# Patient Record
Sex: FEMALE | Race: WHITE
[De-identification: names, ages, dates, MRNs, and addresses within clinical notes are randomized per-mention and may not be internally consistent; named-entity substitution may affect disease eponyms.]

---

## 2019-05-14 ENCOUNTER — APPOINTMENT (OUTPATIENT)
Dept: OTOLARYNGOLOGY | Facility: CLINIC | Age: 39
End: 2019-05-14
Payer: COMMERCIAL

## 2019-05-14 VITALS
HEIGHT: 59 IN | WEIGHT: 115 LBS | BODY MASS INDEX: 23.18 KG/M2 | HEART RATE: 85 BPM | DIASTOLIC BLOOD PRESSURE: 67 MMHG | SYSTOLIC BLOOD PRESSURE: 113 MMHG

## 2019-05-14 DIAGNOSIS — Z82.5 FAMILY HISTORY OF ASTHMA AND OTHER CHRONIC LOWER RESPIRATORY DISEASES: ICD-10-CM

## 2019-05-14 DIAGNOSIS — F31.30 BIPOLAR DISORDER, CURRENT EPISODE DEPRESSED, MILD OR MODERATE SEVERITY, UNSPECIFIED: ICD-10-CM

## 2019-05-14 DIAGNOSIS — Z87.39 PERSONAL HISTORY OF OTHER DISEASES OF THE MUSCULOSKELETAL SYSTEM AND CONNECTIVE TISSUE: ICD-10-CM

## 2019-05-14 DIAGNOSIS — Z87.42 PERSONAL HISTORY OF OTHER DISEASES OF THE FEMALE GENITAL TRACT: ICD-10-CM

## 2019-05-14 DIAGNOSIS — Z87.09 PERSONAL HISTORY OF OTHER DISEASES OF THE RESPIRATORY SYSTEM: ICD-10-CM

## 2019-05-14 DIAGNOSIS — M62.89 OTHER SPECIFIED DISORDERS OF MUSCLE: ICD-10-CM

## 2019-05-14 DIAGNOSIS — K30 FUNCTIONAL DYSPEPSIA: ICD-10-CM

## 2019-05-14 DIAGNOSIS — Z87.19 PERSONAL HISTORY OF OTHER DISEASES OF THE DIGESTIVE SYSTEM: ICD-10-CM

## 2019-05-14 DIAGNOSIS — Z83.3 FAMILY HISTORY OF DIABETES MELLITUS: ICD-10-CM

## 2019-05-14 DIAGNOSIS — Z91.013 ALLERGY TO SEAFOOD: ICD-10-CM

## 2019-05-14 DIAGNOSIS — Z00.00 ENCOUNTER FOR GENERAL ADULT MEDICAL EXAMINATION W/OUT ABNORMAL FINDINGS: ICD-10-CM

## 2019-05-14 DIAGNOSIS — Z86.59 PERSONAL HISTORY OF OTHER MENTAL AND BEHAVIORAL DISORDERS: ICD-10-CM

## 2019-05-14 PROCEDURE — 31231 NASAL ENDOSCOPY DX: CPT

## 2019-05-14 PROCEDURE — 99213 OFFICE O/P EST LOW 20 MIN: CPT | Mod: 25

## 2019-05-14 RX ORDER — IPRATROPIUM BROMIDE AND ALBUTEROL SULFATE 2.5; .5 MG/3ML; MG/3ML
SOLUTION RESPIRATORY (INHALATION)
Refills: 0 | Status: ACTIVE | COMMUNITY

## 2019-05-14 RX ORDER — LORATADINE 5 MG
17 TABLET,CHEWABLE ORAL
Refills: 0 | Status: ACTIVE | COMMUNITY

## 2019-05-14 RX ORDER — RABEPRAZOLE SODIUM 20 MG/1
20 TABLET, DELAYED RELEASE ORAL
Refills: 0 | Status: ACTIVE | COMMUNITY

## 2019-05-14 RX ORDER — LITHIUM CARBONATE 150 MG/1
150 CAPSULE ORAL
Refills: 0 | Status: ACTIVE | COMMUNITY

## 2019-05-14 RX ORDER — MONTELUKAST SODIUM 10 MG/1
TABLET, FILM COATED ORAL
Refills: 0 | Status: ACTIVE | COMMUNITY

## 2019-05-14 RX ORDER — LEVONORGESTREL AND ETHINYL ESTRADIOL 0.1-0.02MG
KIT ORAL
Refills: 0 | Status: ACTIVE | COMMUNITY

## 2019-05-14 RX ORDER — DIVALPROEX SODIUM 500 MG/1
TABLET, DELAYED RELEASE ORAL
Refills: 0 | Status: ACTIVE | COMMUNITY

## 2019-05-14 RX ORDER — LINACLOTIDE 72 UG/1
72 CAPSULE, GELATIN COATED ORAL
Refills: 0 | Status: ACTIVE | COMMUNITY

## 2019-05-14 RX ORDER — CLONAZEPAM 2 MG/1
TABLET ORAL
Refills: 0 | Status: ACTIVE | COMMUNITY

## 2019-05-14 RX ORDER — FLUTICASONE PROPIONATE AND SALMETEROL 50; 250 UG/1; UG/1
250-50 POWDER RESPIRATORY (INHALATION)
Refills: 0 | Status: ACTIVE | COMMUNITY

## 2019-05-14 RX ORDER — ALBUTEROL SULFATE 2.5 MG/3ML
VIAL, NEBULIZER (ML) INHALATION
Refills: 0 | Status: ACTIVE | COMMUNITY

## 2019-05-14 RX ORDER — DOCUSATE SODIUM 100 MG/1
CAPSULE ORAL
Refills: 0 | Status: ACTIVE | COMMUNITY

## 2019-05-14 NOTE — CONSULT LETTER
[Dear  ___] : Dear  [unfilled], [Please see my note below.] : Please see my note below. [Courtesy Letter:] : I had the pleasure of seeing your patient, [unfilled], in my office today. [Consult Closing:] : Thank you very much for allowing me to participate in the care of this patient.  If you have any questions, please do not hesitate to contact me. [Sincerely,] : Sincerely, [FreeTextEntry3] : Allyn Russell MD\par

## 2019-05-14 NOTE — ASSESSMENT
[FreeTextEntry1] : She has had a cough, ear pressure, ear pain, nasal congestion and postnasal drip. This started after she had the stomach flu. She improved with antibiotics but her symptoms returned. Nasal endoscopy showed no sinus infection. Her sinuses looked clear. She is likely having exacerbation of reflux. She has reflux-related laryngeal changes on flexible laryngoscopy. She also has TMJ dysfunction. She has not been wearing her nightguard\par \par PLAN\par \par -findings and management options discussed in detail with the patient. \par -Nasal saline rinses, nasal steroid spray (such as fluticasone), antihistamine/decongestant as needed\par -consider repeat Allergy evaluation- she said there may be a water leak in her apartment\par -Continue reflux precautions and medication for reflux\par -Treatment for TMJD recommended. I asked her to wear her nightguard.  She may consider physical therapy\par -She'll speak with her PCP to see if she requires further pulmonary evaluation\par -follow up in the next couple of weeks if her symptoms do not improve. If she is doing well, I will see her back in 3-6 months\par -call and return earlier if any concerns. \par

## 2019-05-14 NOTE — HISTORY OF PRESENT ILLNESS
[de-identified] : ALLEN FLORES is a 39 year patient With a history of chronic sinusitis, sinus surgery, reflux, and TMJ dysfunction. She had been doing well until she developed symptoms after a stomach flu about 4 weeks ago. She had sinus pain and pressure and a cough. She was initially thought to have a viral infection. She improved and then she had a flareup of her asthma. She also had recurrent symptoms. She was treated with Augmentin which helped. Her symptoms returned since she finished medication. She has no nasal drainage but does have nasal congestion and a sensation of restrictive airway. She also has ear pain and pressure, and postnasal drip. She is using inhalers for the asthma. She takes Singulair. She is not currently using a nasal steroid spray. She is on AcipHex twice a day. She has TMJ dysfunction but has not been using her nightguard. She did see a neurologist recently for a tremor.\par \par ENT history\par \par 11/13/18- possible sinus infection. She had been doing well until mid August. She had a flareup of her asthma. She was put on Singulair and aninhaler. in September, she developed a pulmonary infection. Her  had been sick. She was treated with a Z-Nazario. She then had to take Augmentin. She has a mild intermittent postnasal drip, sinus and nasal pressure, throat mucus and mild cough. she denies throat pain or dysphagia. She also had to restart Lithium which  has made her reflux worse. She is double dose aciphex. She said she had a throat culture was was negative. She is using a  for TMJ dysfunction.\par \par 1/30/18-She still has a cough. the burning sensation in her throat is improved but not resolved. She is having a lot problems with reflux. She had to restart her lithium. She has spoken with her gastroenterologist. She is on medication for the reflux. 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 An  has the flu. The dizziness has improved. she thinks it could be due to her medication. A culture was sent to rule out bacterial and fungal infection at her last vist. Unfortunately the specimen was lost in transport. She is here for repeat culture. Culture is negative.\par \par 1/11/18- She has had a one-month history of a burning sensation in her mouth and tongue. She denies\par dysphagia or voice change. She has been under more stress. She also had exacerbation of her reflux. She is\par on medication for reflux. She is taking it once a day. She is working with her gastroenterologist to get it under\par control. Her sinuses have been doing very well. She has not had a sinus infection. She has no nasal\par obstruction. She has slight headaches. She uses nasal saline rinses and allergy medications as needed.\par She also has a clicking sensation and a swishing sensation in her ear. She has a history of TMJ dysfunction.\par She has mild otalgia. She also has a history of dizziness. It has been occurring intermittently since July. The\par episodes last several seconds. She sometimes has a spinning sensation when she turns in bed. Most of the\par time, she feels a sudden drop in pressure like she is going to fall when she stands up. She said that she had an evaluation when she was younger by a neurologist for other reasons. 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 She said that specialized inner ear testing was attempted in the past. Because of her medications, they were unable to get results. DHP- negative. NE/FL\par Audiogram 3/2017–normal hearing, word recognition, and tympanograms\par \par 10/19/17- patient seen for a history of chronic sinusitis. She was followed by Dr. Givens. She had endoscopic sinus surgery with septoplasty in July. She also suffers from asthma, reflux, and other medical conditions. She has done extremely well following surgery. She had a mild upper respiratory tract infection in September. She has some pulmonary symptoms. 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 She started using Nasonex again. She uses nasal saline rinses. She tries to avoid taking steroids. She has mild intermittent nasal congestion but no facial pain or headaches. Her breathing is good. She takes medication for reflux. She occasionally feels like she is She takes medication for reflux. She occasionally feels like she is going to drop on the floor. She has no vertigo. She thinks it could be due to one of her medications. NE/FL

## 2019-06-04 ENCOUNTER — APPOINTMENT (OUTPATIENT)
Dept: OTOLARYNGOLOGY | Facility: CLINIC | Age: 39
End: 2019-06-04
Payer: COMMERCIAL

## 2019-06-04 PROCEDURE — 99213 OFFICE O/P EST LOW 20 MIN: CPT | Mod: 25

## 2019-06-04 PROCEDURE — 31231 NASAL ENDOSCOPY DX: CPT

## 2019-06-04 RX ORDER — LINACLOTIDE 145 UG/1
145 CAPSULE, GELATIN COATED ORAL
Qty: 90 | Refills: 0 | Status: ACTIVE | COMMUNITY
Start: 2018-04-03

## 2019-06-04 RX ORDER — DIPHENHYDRAMINE HYDROCHLORIDE AND LIDOCAINE HYDROCHLORIDE AND ALUMINUM HYDROXIDE AND MAGNESIUM HYDRO
KIT 4 TIMES DAILY
Qty: 60 | Refills: 0 | Status: ACTIVE | COMMUNITY
Start: 2019-06-04 | End: 1900-01-01

## 2019-06-04 RX ORDER — DIVALPROEX SODIUM 125 MG/1
125 TABLET, DELAYED RELEASE ORAL
Qty: 30 | Refills: 0 | Status: ACTIVE | COMMUNITY
Start: 2019-01-08

## 2019-06-04 RX ORDER — ESCITALOPRAM OXALATE 5 MG/5ML
5 SOLUTION ORAL
Qty: 75 | Refills: 0 | Status: ACTIVE | COMMUNITY
Start: 2018-11-19

## 2019-06-04 RX ORDER — DIVALPROEX SODIUM 250 MG/1
250 TABLET, DELAYED RELEASE ORAL
Qty: 150 | Refills: 0 | Status: ACTIVE | COMMUNITY
Start: 2019-01-08

## 2019-06-04 RX ORDER — LITHIUM CARBONATE 300 MG/1
300 TABLET, FILM COATED, EXTENDED RELEASE ORAL
Qty: 30 | Refills: 0 | Status: ACTIVE | COMMUNITY
Start: 2018-12-12

## 2019-06-04 NOTE — ASSESSMENT
[FreeTextEntry1] : She is doing better following day course of Levaquin. She has left sinus pain and pressure. The sinuses are clear on nasal endoscopy. This is likely referred pain from TMJ dysfunction. She has also been having reflux exacerbation. She is on medication. She saw her gastroenterologist.  She also has a burning sensation of her tongue. We discussed the possibility of burning tongue syndrome. A culture was sent to rule out fungal infection. \par \par PLAN\par \par -findings and management options discussed in detail with the patient. \par -Nasal saline rinses, nasal steroid spray (such as fluticasone), antihistamine/decongestant as needed\par -she is going to go for repeat Allergy evaluation\par -Continue reflux precautions and medication for reflux\par -Treatment for TMJD recommended again. she should wear her nightguard.  \par -consider physical therapy for TMJD\par -pulmonary evaluation recommended\par -I discussed management for burning tongue syndrome. I asked her to avoid alcohol base mouthwashes. She may try Vitamin B12. I also gave her Magic mouthwash to use as needed for pain. She has taken Magic mouthwash in the past\par -she was asked to call for the culture results. \par -follow up in 2 weeks if not better. If she is better, follow up in 3 months. \par -call and return earlier if any concerns. \par

## 2019-06-04 NOTE — CONSULT LETTER
[Dear  ___] : Dear  [unfilled], [Courtesy Letter:] : I had the pleasure of seeing your patient, [unfilled], in my office today. [Please see my note below.] : Please see my note below. [Consult Closing:] : Thank you very much for allowing me to participate in the care of this patient.  If you have any questions, please do not hesitate to contact me. [Sincerely,] : Sincerely, [FreeTextEntry3] : Allyn Russell MD\par

## 2019-06-04 NOTE — REASON FOR VISIT
[Subsequent Evaluation] : a subsequent evaluation for [FreeTextEntry2] : cough and chronic sinusitis

## 2019-06-04 NOTE — HISTORY OF PRESENT ILLNESS
[FreeTextEntry1] : \par \par 6/4/19- Lacey Garza Is here for followup. She felt better after her last visit. She then developed recurrent symptoms of chills, cough, sinus pressure and nasal congestion. She saw her PCP and was placed on Levaquin. She is improving. She still has a mild cough and post nasal drip. She also has sharp left facial pain over the maxillary sinus. She also has burning of the anterior tongue. She is using her allergy and sinus medications. She is also medication for reflux. [de-identified] : ALLEN FLORES is a 39 year patient With a history of chronic sinusitis, sinus surgery, reflux, and TMJ dysfunction. She had been doing well until she developed symptoms after a stomach flu about 4 weeks ago. She had sinus pain and pressure and a cough. She was initially thought to have a viral infection. She improved and then she had a flareup of her asthma. She also had recurrent symptoms. She was treated with Augmentin which helped. Her symptoms returned since she finished medication. She has no nasal drainage but does have nasal congestion and a sensation of restrictive airway. She also has ear pain and pressure, and postnasal drip. She is using inhalers for the asthma. She takes Singulair. She is not currently using a nasal steroid spray. She is on AcipHex twice a day. She has TMJ dysfunction but has not been using her nightguard. She did see a neurologist recently for a tremor.\par \par ENT history\par \par 11/13/18- possible sinus infection. She had been doing well until mid August. She had a flareup of her asthma. She was put on Singulair and aninhaler. in September, she developed a pulmonary infection. Her  had been sick. She was treated with a Z-Nazario. She then had to take Augmentin. She has a mild intermittent postnasal drip, sinus and nasal pressure, throat mucus and mild cough. she denies throat pain or dysphagia. She also had to restart Lithium which  has made her reflux worse. She is double dose aciphex. She said she had a throat culture was was negative. She is using a  for TMJ dysfunction.\par \par 1/30/18-She still has a cough. the burning sensation in her throat is improved but not resolved. She is having a lot problems with reflux. She had to restart her lithium. She has spoken with her gastroenterologist. She is on medication for the reflux. 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 An  has the flu. The dizziness has improved. she thinks it could be due to her medication. A culture was sent to rule out bacterial and fungal infection at her last vist. Unfortunately the specimen was lost in transport. She is here for repeat culture. Culture is negative.\par \par 1/11/18- She has had a one-month history of a burning sensation in her mouth and tongue. She denies\par dysphagia or voice change. She has been under more stress. She also had exacerbation of her reflux. She is\par on medication for reflux. She is taking it once a day. She is working with her gastroenterologist to get it under\par control. Her sinuses have been doing very well. She has not had a sinus infection. She has no nasal\par obstruction. She has slight headaches. She uses nasal saline rinses and allergy medications as needed.\par She also has a clicking sensation and a swishing sensation in her ear. She has a history of TMJ dysfunction.\par She has mild otalgia. She also has a history of dizziness. It has been occurring intermittently since July. The\par episodes last several seconds. She sometimes has a spinning sensation when she turns in bed. Most of the\par time, she feels a sudden drop in pressure like she is going to fall when she stands up. She said that she had an evaluation when she was younger by a neurologist for other reasons. 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 She said that specialized inner ear testing was attempted in the past. Because of her medications, they were unable to get results. DHP- negative. NE/FL\par Audiogram 3/2017–normal hearing, word recognition, and tympanograms\par \par 10/19/17- patient seen for a history of chronic sinusitis. She was followed by Dr. Givens. She had endoscopic sinus surgery with septoplasty in July. She also suffers from asthma, reflux, and other medical conditions. She has done extremely well following surgery. She had a mild upper respiratory tract infection in September. She has some pulmonary symptoms. 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 She started using Nasonex again. She uses nasal saline rinses. She tries to avoid taking steroids. She has mild intermittent nasal congestion but no facial pain or headaches. Her breathing is good. She takes medication for reflux. She occasionally feels like she is She takes medication for reflux. She occasionally feels like she is going to drop on the floor. She has no vertigo. She thinks it could be due to one of her medications. NE/FL

## 2019-06-12 LAB — FUNGUS SPEC CULT ORG #8: ABNORMAL

## 2019-06-12 RX ORDER — NYSTATIN 100000 [USP'U]/ML
100000 SUSPENSION ORAL 3 TIMES DAILY
Qty: 105 | Refills: 1 | Status: ACTIVE | COMMUNITY
Start: 2019-06-12 | End: 1900-01-01

## 2019-08-26 ENCOUNTER — APPOINTMENT (OUTPATIENT)
Dept: OTOLARYNGOLOGY | Facility: CLINIC | Age: 39
End: 2019-08-26
Payer: COMMERCIAL

## 2019-08-26 DIAGNOSIS — K14.6 GLOSSODYNIA: ICD-10-CM

## 2019-08-26 PROCEDURE — 31231 NASAL ENDOSCOPY DX: CPT

## 2019-08-26 PROCEDURE — 99213 OFFICE O/P EST LOW 20 MIN: CPT | Mod: 25

## 2019-08-26 NOTE — HISTORY OF PRESENT ILLNESS
[de-identified] : ALLEN FLORES is a 39 year patient With a history of chronic sinusitis, sinus surgery, reflux, and TMJ dysfunction. She had been doing well until she developed symptoms after a stomach flu about 4 weeks ago. She had sinus pain and pressure and a cough. She was initially thought to have a viral infection. She improved and then she had a flareup of her asthma. She also had recurrent symptoms. She was treated with Augmentin which helped. Her symptoms returned since she finished medication. She has no nasal drainage but does have nasal congestion and a sensation of restrictive airway. She also has ear pain and pressure, and postnasal drip. She is using inhalers for the asthma. She takes Singulair. She is not currently using a nasal steroid spray. She is on AcipHex twice a day. She has TMJ dysfunction but has not been using her nightguard. She did see a neurologist recently for a tremor.\par \par ENT history\par \par 11/13/18- possible sinus infection. She had been doing well until mid August. She had a flareup of her asthma. She was put on Singulair and aninhaler. in September, she developed a pulmonary infection. Her  had been sick. She was treated with a Z-Nazario. She then had to take Augmentin. She has a mild intermittent postnasal drip, sinus and nasal pressure, throat mucus and mild cough. she denies throat pain or dysphagia. She also had to restart Lithium which  has made her reflux worse. She is double dose aciphex. She said she had a throat culture was was negative. She is using a  for TMJ dysfunction.\par \par 1/30/18-She still has a cough. the burning sensation in her throat is improved but not resolved. She is having a lot problems with reflux. She had to restart her lithium. She has spoken with her gastroenterologist. She is on medication for the reflux. 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 An  has the flu. The dizziness has improved. she thinks it could be due to her medication. A culture was sent to rule out bacterial and fungal infection at her last vist. Unfortunately the specimen was lost in transport. She is here for repeat culture. Culture is negative.\par \par 1/11/18- She has had a one-month history of a burning sensation in her mouth and tongue. She denies\par dysphagia or voice change. She has been under more stress. She also had exacerbation of her reflux. She is\par on medication for reflux. She is taking it once a day. She is working with her gastroenterologist to get it under\par control. Her sinuses have been doing very well. She has not had a sinus infection. She has no nasal\par obstruction. She has slight headaches. She uses nasal saline rinses and allergy medications as needed.\par She also has a clicking sensation and a swishing sensation in her ear. She has a history of TMJ dysfunction.\par She has mild otalgia. She also has a history of dizziness. It has been occurring intermittently since July. The\par episodes last several seconds. She sometimes has a spinning sensation when she turns in bed. Most of the\par time, she feels a sudden drop in pressure like she is going to fall when she stands up. She said that she had an evaluation when she was younger by a neurologist for other reasons. 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 She said that specialized inner ear testing was attempted in the past. Because of her medications, they were unable to get results. DHP- negative. NE/FL\par Audiogram 3/2017–normal hearing, word recognition, and tympanograms\par \par 10/19/17- patient seen for a history of chronic sinusitis. She was followed by Dr. Givens. She had endoscopic sinus surgery with septoplasty in July. She also suffers from asthma, reflux, and other medical conditions. She has done extremely well following surgery. She had a mild upper respiratory tract infection in September. She has some pulmonary symptoms. 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 She started using Nasonex again. She uses nasal saline rinses. She tries to avoid taking steroids. She has mild intermittent nasal congestion but no facial pain or headaches. Her breathing is good. She takes medication for reflux. She occasionally feels like she is She takes medication for reflux. She occasionally feels like she is going to drop on the floor. She has no vertigo. She thinks it could be due to one of her medications. NE/FL [FreeTextEntry1] : \par \par 6/4/19- Lacey Garza Is here for followup. She felt better after her last visit. She then developed recurrent symptoms of chills, cough, sinus pressure and nasal congestion. She saw her PCP and was placed on Levaquin. She is improving. She still has a mild cough and post nasal drip. She also has sharp left facial pain over the maxillary sinus. She also has burning of the anterior tongue. She is using her allergy and sinus medications. She is also medication for reflux.\par \par 8/26/19- Lacey Mcconnell is here for follow up for chronic sinusitis and reflux.  she felt better after treatment for candida in June. She continues to have left facial pain over the medial maxilla.  She has left nasal congestion. She had an upper endoscopy and colonoscopy last week.  She may have irritation of the nasal cavity for that.  She said she had a sinus x-ray.  she has a dental appointment. She has TMJD and just started wearing her nightguard again.  She has a mild sore throat and postnasal drip.  She is working with her gastroenterologist to manage the reflux. She was found to have a hiatal hernia and gastric polyps.

## 2019-08-26 NOTE — ASSESSMENT
[FreeTextEntry1] : She is doing well except he has persistent left sinus pain and pressure. On nasal endoscopy, there is no sinus infection. I suspect this is due to TMJ dysfunction. She started during her nightguard. She has a dental appointment pending. She is also working with her gastroenterologist to manage her reflux. She has mild left nasal congestion and dryness.\par \par PLAN\par \par -findings and management options discussed in detail with the patient. \par -Nasal saline rinses, nasal steroid spray (such as fluticasone), antihistamine/decongestant as needed\par -moisturizing nasal gel for the dryness\par -allergy management\par -Continue reflux precautions and medication for reflux per her GI\par -Treatment for TMJD recommended again.  She is now using her nightguard\par -she may consider physical therapy for TMJD\par -follow up if she has persistent or worsening symptoms. otherwise If she is better, follow up in approximately 3 months. I asked her to call me with an update after the dental appointment.

## 2019-09-02 PROBLEM — Z86.59 HISTORY OF POST TRAUMATIC STRESS DISORDER: Status: RESOLVED | Noted: 2019-05-14 | Resolved: 2019-09-02

## 2019-12-12 ENCOUNTER — APPOINTMENT (OUTPATIENT)
Dept: OTOLARYNGOLOGY | Facility: CLINIC | Age: 39
End: 2019-12-12
Payer: COMMERCIAL

## 2019-12-12 DIAGNOSIS — K21.9 GASTRO-ESOPHAGEAL REFLUX DISEASE W/OUT ESOPHAGITIS: ICD-10-CM

## 2019-12-12 DIAGNOSIS — J32.9 CHRONIC SINUSITIS, UNSPECIFIED: ICD-10-CM

## 2019-12-12 DIAGNOSIS — M26.609 UNSPECIFIED TEMPOROMANDIBULAR JOINT DISORDER: ICD-10-CM

## 2019-12-12 PROCEDURE — 99213 OFFICE O/P EST LOW 20 MIN: CPT | Mod: 25

## 2019-12-12 PROCEDURE — 31231 NASAL ENDOSCOPY DX: CPT

## 2019-12-12 NOTE — HISTORY OF PRESENT ILLNESS
[de-identified] : 5/14/19- ALLEN FLORES is a 39 year patient With a history of chronic sinusitis, sinus surgery, reflux, and TMJ dysfunction. She had been doing well until she developed symptoms after a stomach flu about 4 weeks ago. She had sinus pain and pressure and a cough. She was initially thought to have a viral infection. She improved and then she had a flareup of her asthma. She also had recurrent symptoms. She was treated with Augmentin which helped. Her symptoms returned since she finished medication. She has no nasal drainage but does have nasal congestion and a sensation of restrictive airway. She also has ear pain and pressure, and postnasal drip. She is using inhalers for the asthma. She takes Singulair. She is not currently using a nasal steroid spray. She is on AcipHex twice a day. She has TMJ dysfunction but has not been using her nightguard. She did see a neurologist recently for a tremor.\par \par ENT history\par \par 11/13/18- possible sinus infection. She had been doing well until mid August. She had a flareup of her asthma. She was put on Singulair and aninhaler. in September, she developed a pulmonary infection. Her  had been sick. She was treated with a Z-Nazario. She then had to take Augmentin. She has a mild intermittent postnasal drip, sinus and nasal pressure, throat mucus and mild cough. she denies throat pain or dysphagia. She also had to restart Lithium which  has made her reflux worse. She is double dose aciphex. She said she had a throat culture was was negative. She is using a  for TMJ dysfunction.\par \par 1/30/18-She still has a cough. the burning sensation in her throat is improved but not resolved. She is having a lot problems with reflux. She had to restart her lithium. She has spoken with her gastroenterologist. She is on medication for the reflux. 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 An  has the flu. The dizziness has improved. she thinks it could be due to her medication. A culture was sent to rule out bacterial and fungal infection at her last vist. Unfortunately the specimen was lost in transport. She is here for repeat culture. Culture is negative.\par \par 1/11/18- She has had a one-month history of a burning sensation in her mouth and tongue. She denies\par dysphagia or voice change. She has been under more stress. She also had exacerbation of her reflux. She is\par on medication for reflux. She is taking it once a day. She is working with her gastroenterologist to get it under\par control. Her sinuses have been doing very well. She has not had a sinus infection. She has no nasal\par obstruction. She has slight headaches. She uses nasal saline rinses and allergy medications as needed.\par She also has a clicking sensation and a swishing sensation in her ear. She has a history of TMJ dysfunction.\par She has mild otalgia. She also has a history of dizziness. It has been occurring intermittently since July. The\par episodes last several seconds. She sometimes has a spinning sensation when she turns in bed. Most of the\par time, she feels a sudden drop in pressure like she is going to fall when she stands up. She said that she had an evaluation when she was younger by a neurologist for other reasons. 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 She said that specialized inner ear testing was attempted in the past. Because of her medications, they were unable to get results. DHP- negative. NE/FL\par Audiogram 3/2017–normal hearing, word recognition, and tympanograms\par \par 10/19/17- patient seen for a history of chronic sinusitis. She was followed by Dr. Givens. She had endoscopic sinus surgery with septoplasty in July. She also suffers from asthma, reflux, and other medical conditions. She has done extremely well following surgery. She had a mild upper respiratory tract infection in September. She has some pulmonary symptoms. 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 She started using Nasonex again. She uses nasal saline rinses. She tries to avoid taking steroids. She has mild intermittent nasal congestion but no facial pain or headaches. Her breathing is good. She takes medication for reflux. She occasionally feels like she is She takes medication for reflux. She occasionally feels like she is going to drop on the floor. She has no vertigo. She thinks it could be due to one of her medications. NE/FL [FreeTextEntry1] : \par \par 6/4/19Max Garza Is here for followup. She felt better after her last visit. She then developed recurrent symptoms of chills, cough, sinus pressure and nasal congestion. She saw her PCP and was placed on Levaquin. She is improving. She still has a mild cough and post nasal drip. She also has sharp left facial pain over the maxillary sinus. She also has burning of the anterior tongue. She is using her allergy and sinus medications. She is also medication for reflux.\par \par 8/26/19Max Mcconnell is here for follow up for chronic sinusitis and reflux.  she felt better after treatment for candida in June. She continues to have left facial pain over the medial maxilla.  She has left nasal congestion. She had an upper endoscopy and colonoscopy last week.  She may have irritation of the nasal cavity for that.  She said she had a sinus x-ray.  she has a dental appointment. She has TMJD and just started wearing her nightguard again.  She has a mild sore throat and postnasal drip.  She is working with her gastroenterologist to manage the reflux. She was found to have a hiatal hernia and gastric polyps.\par \par 12/12/19Max Garza Is here for recurrent sinusitis. She said that she has been treated with antibiotics 3 times for bronchitis and sinusitis since October. She has a cough with green mucus. She has dental pain and sinus pressure. She has been under more stress. Her mother was diagnosed with cancer. She has TMJ dysfunction and uses her nightguard. She's also on medication for reflux. She just finished a course of Augmentin.

## 2019-12-12 NOTE — ASSESSMENT
[FreeTextEntry1] : She has had 3 episodes of bronchitis and sinusitis since October. She finished a course of Augmentin recently. Nasal endoscopy showed no obvious sinus infection today. She likely has exacerbation of reflux and TMJ dysfunction. She has been under more stress recently.\par \par PLAN\par \par -findings and management options discussed in detail with the patient. \par -Nasal saline rinses, nasal steroid spray (such as fluticasone), antihistamine/decongestant as needed\par -allergy management\par -Continue reflux precautions and medication for reflux per her GI\par -continue treatment for TMJD.  She said she may also have two cavities on the left side. I recommended she follow up with her dentist for treatment.  \par -consider PT for the TMJD\par -I would like to see her if she feels that she has a recurrent infection.\par -follow up in approximately 3 months or earlier if needed.

## 2019-12-12 NOTE — CONSULT LETTER
[Courtesy Letter:] : I had the pleasure of seeing your patient, [unfilled], in my office today. [Dear  ___] : Dear  [unfilled], [Please see my note below.] : Please see my note below. [Consult Closing:] : Thank you very much for allowing me to participate in the care of this patient.  If you have any questions, please do not hesitate to contact me. [Sincerely,] : Sincerely, [FreeTextEntry3] : Allyn Russell MD\par